# Patient Record
Sex: FEMALE | Race: WHITE | URBAN - METROPOLITAN AREA
[De-identification: names, ages, dates, MRNs, and addresses within clinical notes are randomized per-mention and may not be internally consistent; named-entity substitution may affect disease eponyms.]

---

## 2017-07-31 ENCOUNTER — HOSPITAL ENCOUNTER (OUTPATIENT)
Dept: MAMMOGRAPHY | Facility: CLINIC | Age: 47
Discharge: HOME | End: 2017-07-31
Attending: OBSTETRICS & GYNECOLOGY | Admitting: OBSTETRICS & GYNECOLOGY
Payer: COMMERCIAL

## 2017-07-31 DIAGNOSIS — Z12.31 VISIT FOR SCREENING MAMMOGRAM: ICD-10-CM

## 2017-07-31 PROCEDURE — 77063 BREAST TOMOSYNTHESIS BI: CPT

## 2017-07-31 PROCEDURE — G0202 SCR MAMMO BI INCL CAD: HCPCS

## 2018-01-22 ENCOUNTER — OFFICE VISIT (OUTPATIENT)
Dept: FAMILY MEDICINE | Facility: CLINIC | Age: 48
End: 2018-01-22
Payer: COMMERCIAL

## 2018-01-22 VITALS
HEART RATE: 80 BPM | HEIGHT: 66 IN | SYSTOLIC BLOOD PRESSURE: 116 MMHG | TEMPERATURE: 97.6 F | BODY MASS INDEX: 25.88 KG/M2 | WEIGHT: 161 LBS | OXYGEN SATURATION: 95 % | DIASTOLIC BLOOD PRESSURE: 76 MMHG | RESPIRATION RATE: 14 BRPM

## 2018-01-22 DIAGNOSIS — Z72.0 TOBACCO ABUSE: ICD-10-CM

## 2018-01-22 DIAGNOSIS — R05.3 PERSISTENT COUGH: Primary | ICD-10-CM

## 2018-01-22 PROCEDURE — 99213 OFFICE O/P EST LOW 20 MIN: CPT | Performed by: FAMILY MEDICINE

## 2018-01-22 RX ORDER — AZITHROMYCIN 250 MG/1
TABLET, FILM COATED ORAL
Qty: 6 TABLET | Refills: 0 | Status: SHIPPED | OUTPATIENT
Start: 2018-01-22

## 2018-01-22 NOTE — NURSING NOTE
"Chief Complaint   Patient presents with     URI     Patient Request for Note/Letter     for employer - certificate of illness longer than 6 days       Initial /76  Pulse 80  Temp 97.6  F (36.4  C) (Tympanic)  Resp 14  Ht 5' 5.5\" (1.664 m)  Wt 161 lb (73 kg)  SpO2 95%  Breastfeeding? No  BMI 26.38 kg/m2 Estimated body mass index is 26.38 kg/(m^2) as calculated from the following:    Height as of this encounter: 5' 5.5\" (1.664 m).    Weight as of this encounter: 161 lb (73 kg).  BP completed using cuff size: regular    Health Maintenance that is potentially due pending provider review:  Health Maintenance Due   Topic Date Due     TETANUS IMMUNIZATION (SYSTEM ASSIGNED)  03/03/1986     PAP SCREENING Q3 YR (SYSTEM ASSIGNED)  03/03/1989     LIPID SCREEN Q5 YR FEMALE (SYSTEM ASSIGNED)  03/03/2013     INFLUENZA VACCINE (SYSTEM ASSIGNED)  09/01/2017         Pap done 2016 at [facility]  "

## 2018-01-22 NOTE — MR AVS SNAPSHOT
"              After Visit Summary   2018    Christy Pruitt    MRN: [id number]           Patient Information     Date Of Birth          1970        Visit Information        Provider Department      2018 10:00 AM Hedy Pedroza MD [facility]        Today's Diagnoses     Persistent cough    -  1      Care Instructions    Try Mucinex DM (dextromethorphan) twice daily with lots of fluids for cough              Follow-ups after your visit        Who to contact     If you have questions or need follow up information about today's clinic visit or your schedule please contact [facility] directly at [phone].  Normal or non-critical lab and imaging results will be communicated to you by MyChart, letter or phone within 4 business days after the clinic has received the results. If you do not hear from us within 7 days, please contact the clinic through MyChart or phone. If you have a critical or abnormal lab result, we will notify you by phone as soon as possible.  Submit refill requests through Big[employer] or call your pharmacy and they will forward the refill request to us. Please allow 3 business days for your refill to be completed.          Additional Information About Your Visit        MyChart Information     BigTip lets you send messages to your doctor, view your test results, renew your prescriptions, schedule appointments and more. To sign up, go to [email or web address] . Click on \"Log in\" on the left side of the screen, which will take you to the Welcome page. Then click on \"Sign up Now\" on the right side of the page.     You will be asked to enter the access code listed below, as well as some personal information. Please follow the directions to create your username and password.     Your access code is: [email or web address]  Expires: 2018 10:19 AM     Your access code will  in 90 days. If you need help or a new code, please call your [place] clinic or " "[phone].        Care EveryWhere ID     This is your Care EveryWhere ID. This could be used by other organizations to access your [place] medical records  [id number]        Your Vitals Were     Pulse Temperature Respirations Height Pulse Oximetry Breastfeeding?    80 97.6  F (36.4  C) (Tympanic) 14 5' 5.5\" (1.664 m) 95% No    BMI (Body Mass Index)                   26.38 kg/m2            Blood Pressure from Last 3 Encounters:   01/22/18 116/76    Weight from Last 3 Encounters:   01/22/18 161 lb (73 kg)              Today, you had the following     No orders found for display         Today's Medication Changes          These changes are accurate as of: 1/22/18 10:19 AM.  If you have any questions, ask your nurse or doctor.               Start taking these medicines.        Dose/Directions    azithromycin 250 MG tablet   Commonly known as:  ZITHROMAX   Used for:  Persistent cough   Started by:  Hedy Pedroza MD        Two tablets first day, then one tablet daily for four days.   Quantity:  6 tablet   Refills:  0            Where to get your medicines      These medications were sent to [facility]/pharmacy #[id number] - [place], [place] - [address]  [address] [address]     Phone:  [phone]     azithromycin 250 MG tablet                Primary Care Provider Office Phone # Fax #    Ignacia Francheska Dhillon [phone]       [facility] [address] [place] [address]   [facility] [address]        Equal Access to Services     DYLAN ROSARIO AH: Hadii brittni rios hadasho Soomaali, waaxda luqadaha, qaybta kaalmada adeegyada, josé whaley adehayley jackson. So [facility] [phone].    ATENCIÓN: Si habla español, tiene a mckeon disposición servicios gratuitos de asistencia lingüística. Llame al [phone].    We comply with applicable federal civil rights laws and [place] laws. We do not discriminate on the basis of race, color, national origin, age, disability, sex, " sexual orientation, or gender identity.            Thank you!     Thank you for choosing [facility]  for your care. Our goal is always to provide you with excellent care. Hearing back from our patients is one way we can continue to improve our services. Please take a few minutes to complete the written survey that you may receive in the mail after your visit with us. Thank you!             Your Updated Medication List - Protect others around you: Learn how to safely use, store and throw away your medicines at www.disposemymeds.org.          This list is accurate as of: 1/22/18 10:19 AM.  Always use your most recent med list.                   Brand Name Dispense Instructions for use Diagnosis    azithromycin 250 MG tablet    ZITHROMAX    6 tablet    Two tablets first day, then one tablet daily for four days.    Persistent cough       sertraline 50 MG tablet    ZOLOFT     Take 50 mg by mouth daily

## 2018-01-22 NOTE — PROGRESS NOTES
SUBJECTIVE:   Christy Pruitt is a 47 year old female who presents to clinic today for the following health issues:      RESPIRATORY SYMPTOMS      Duration: 11 days    Description  Rhinorrhea-yellowish discharge previously, facial pain/pressure, cough-previously productive with green/grey sputum, headache, fatigue/malaise, hoarse voice, myalgias and conjunctival irritation    Severity: mild    Accompanying signs and symptoms: above    History (predisposing factors):  None known-possibly, works as     Precipitating or alleviating factors: None    Therapies tried and outcome:  rest and fluids acetaminophen nyquil    Had to call off from work  Thick yellow mucus  Now has changed  Started with aches congestion and fatigue  Cough is now productive green and grey    History of smoking about a pack lasts 1-2 weeks.  Has quit in the past is eager to try to quit this time around with this infection.  The elevated is in her head. Does not have strong desire to smoke    Does report a little bit of chest heaviness with a cough and overall malaise but no fevers that she is diabetic.      Problem list and histories reviewed & adjusted, as indicated.  Additional history: as documented    Patient Active Problem List   Diagnosis     Tobacco abuse     History reviewed. No pertinent surgical history.    Social History   Substance Use Topics     Smoking status: Former Smoker     Quit date: 1/12/2018     Smokeless tobacco: Never Used     Alcohol use Yes      Comment: 2x week     Family History   Problem Relation Age of Onset     Unknown/Adopted Mother      Unknown/Adopted Father          Current Outpatient Prescriptions   Medication Sig Dispense Refill     sertraline (ZOLOFT) 50 MG tablet Take 50 mg by mouth daily       azithromycin (ZITHROMAX) 250 MG tablet Two tablets first day, then one tablet daily for four days. 6 tablet 0         Reviewed and updated as needed this visit by clinical staff     Reviewed and  "updated as needed this visit by Provider         ROS:  Constitutional, HEENT, cardiovascular, pulmonary, gi and gu systems are negative, except as otherwise noted.      OBJECTIVE:                                                    /76  Pulse 80  Temp 97.6  F (36.4  C) (Tympanic)  Resp 14  Ht 5' 5.5\" (1.664 m)  Wt 161 lb (73 kg)  SpO2 95%  Breastfeeding? No  BMI 26.38 kg/m2  Body mass index is 26.38 kg/(m^2).  GENERAL APPEARANCE: alert and mild distress  HENT: ear canals and TM's normal, nose and mouth without ulcers or lesions and oral mucous membranes moist  RESP: lungs clear to auscultation - no rales, rhonchi or wheezes  CV: regular rates and rhythm, normal S1 S2, no S3 or S4 and no murmur, click or rub         ASSESSMENT/PLAN:                                                    1. Persistent cough   Discussed that her cough for 11 days could very well be turning into a bacterial infection. Reviewed how tobacco can affect this. Presenting antibiotics but asked her to hold and only take these if her cough becomes productive or worsens. Encouraged supportive cares and clearing her lungs and okay for Mucinex DM  Forms for work are filled out for her missed dates discussed that she likely can return to work in a week or once feeling better  Can start antibiotic if symptoms are worsening  - azithromycin (ZITHROMAX) 250 MG tablet; Two tablets first day, then one tablet daily for four days.  Dispense: 6 tablet; Refill: 0    2. Tobacco abuse  Discussed cessation she has done this before reviewed over-the-counter things to try encouraged her to let me know if she needs any help      Follow-up as needed if symptoms are worsening or no better    Hedy Pedroza MD  [facility]  "

## 2018-02-21 ENCOUNTER — TELEPHONE (OUTPATIENT)
Dept: FAMILY MEDICINE | Facility: CLINIC | Age: 48
End: 2018-02-21

## 2018-02-21 NOTE — TELEPHONE ENCOUNTER
I called Christy. Appt scheduled tomorrow under the Pt's  Name not Yaw. Ignacia will bring the necessary paperwork to appt

## 2018-02-21 NOTE — TELEPHONE ENCOUNTER
Reason for Call:  Other Appointment    Detailed comments: Christy states that she had an appointment scheduled with Dr. Slade today to discuss mother Caterina but no Consent to Communicated was on file so she states she was told to make appointment for tomorrow (assuming Christy has Consent to Communicate completed). There are no openings for tomorrow    Phone Number Patient can be reached at: Christy:  [phone]    Best Time: Anytime    Can we leave a detailed message on this number? YES    Call taken on 2/21/2018 at 1:35 PM by Dedra Garay

## 2020-03-02 ENCOUNTER — HEALTH MAINTENANCE LETTER (OUTPATIENT)
Age: 50
End: 2020-03-02